# Patient Record
Sex: MALE | Race: WHITE
[De-identification: names, ages, dates, MRNs, and addresses within clinical notes are randomized per-mention and may not be internally consistent; named-entity substitution may affect disease eponyms.]

---

## 2018-01-26 ENCOUNTER — HOSPITAL ENCOUNTER (OUTPATIENT)
Dept: HOSPITAL 33 - SDC | Age: 38
Discharge: HOME | End: 2018-01-26
Attending: FAMILY MEDICINE
Payer: COMMERCIAL

## 2018-01-26 VITALS — SYSTOLIC BLOOD PRESSURE: 132 MMHG | HEART RATE: 85 BPM | DIASTOLIC BLOOD PRESSURE: 68 MMHG | OXYGEN SATURATION: 99 %

## 2018-01-26 DIAGNOSIS — K60.2: Primary | ICD-10-CM

## 2018-01-26 PROCEDURE — 00812 ANES LWR INTST SCR COLSC: CPT

## 2018-01-26 PROCEDURE — 0DJD8ZZ INSPECTION OF LOWER INTESTINAL TRACT, VIA NATURAL OR ARTIFICIAL OPENING ENDOSCOPIC: ICD-10-PCS | Performed by: FAMILY MEDICINE

## 2018-01-26 NOTE — OP
SURGERY DATE/TIME:    01/26/2018  0753



PREOPERATIVE DIAGNOSIS:    Rectal bleeding.  



POSTOPERATIVE DIAGNOSIS:    Anal fissure otherwise normal colon exam. 



PROCEDURE:    Colonoscopy.



SURGEON:    Dr. Witt.



ANESTHESIA:    MAC. Medications given by anesthesia department. 



HISTORY: The patient is a 38 year-old white male who reports he has been having bleeding 
off and on from the rectum for years. He reports this has gotten worse recently. It is 
painful and bleeds mostly with a large bowel movement but the patient was also complaining 
of some weight loss and there was some concern about possible family history of colon 
cancer. The patient was felt the need to have endoscopic evaluation. He was appraised of 
the risks of the procedure including the risk of perforation, phlebitis, untoward reaction 
to medication, bleeding and missed lesions.  The patient verbalized his understanding and 
desired to have the procedure performed.



DESCRIPTION OF PROCEDURE: The patient was given the medications by the anesthesia 
department. He had continuous pulse oximetry, ECG monitoring, intermittent blood pressure 
monitoring and tidal CO2 monitoring during the examination. He was placed in the left 
lateral decubitus position.  A digital rectal examination was performed and revealed what 
was felt to be a fissure at the 6:00 position which was somewhat friable and with a small 
amount of bleeding with digital examination. The anal sphincter tone was normal. There 
were no other masses felt. The prostate was normal.  The flexible Olympus pediatric 
colonoscope was used to intubate the rectum.  A view of the colon was developed 
sequentially to the cecum including a short distance into the terminal ileum.  Upon 
insertion and withdrawal, including a retroflex view in the rectum, no mucosal lesions 
were encountered.  The scope was removed from the patient who tolerated the procedure well 
and was sent back to OP recovery in good condition. The prep was noted to be fair.

## 2021-04-24 ENCOUNTER — HOSPITAL ENCOUNTER (EMERGENCY)
Dept: HOSPITAL 33 - ED | Age: 41
Discharge: HOME | End: 2021-04-24
Payer: SELF-PAY

## 2021-04-24 VITALS — SYSTOLIC BLOOD PRESSURE: 143 MMHG | DIASTOLIC BLOOD PRESSURE: 101 MMHG | OXYGEN SATURATION: 99 % | HEART RATE: 84 BPM

## 2021-04-24 DIAGNOSIS — N50.82: Primary | ICD-10-CM

## 2021-04-24 PROCEDURE — 96372 THER/PROPH/DIAG INJ SC/IM: CPT

## 2021-04-24 PROCEDURE — 99283 EMERGENCY DEPT VISIT LOW MDM: CPT

## 2021-04-24 NOTE — ERPHSYRPT
- History of Present Illness


Time Seen by Provider: 04/24/21 16:30


Source: patient


Exam Limitations: no limitations


Patient Subjective Stated Complaint: PT states "I have had horrible pain in my 

groin since yesterday and my right testicle is swollen and really hurts."


Triage Nursing Assessment: Pt presented alert and oriented X 3, skin pwd pt 

ambulates with a wide gait, able to speak in clear full sentences pt in no 

apparent respiratory distress.


Physician History: 





This is a 41-year-old white male who has a tender, swollen right testicle with 

scrotal redness on the right side.  Patient states that he has been very 

sexually active in the last several days.  His current symptoms came on suddenly

yesterday and were worse today.  He has had no penile discharge.  He had a 

similar episode several years ago but it went away on his own.  The swelling at 

that time, and tenderness, were much worse but it lasted several days before it 

went away.  Patient states that he is not allergic to any medication..  He also 

stated that he does not have any insurance and does not want a test done or x-

ray studies performed.  He only wants an antibiotic.


Timing/Duration: yesterday


Activites at Onset: sexual activity


Quality: burning, sharpness


Onset Location: scrotal, right testicle


Pain Radiation: none


Severity of Pain-Max: moderate


Severity of Pain-Current: moderate


Modifying Factors: Improves With: nothing


Associated Symptoms: swelling, No abdominal pain, No fever, No chills, No 

nausea, No vomiting, No dysuria, No urinary frequency, No loss of bladder 

control, No lower back pain


Prior abdominal problems: none


Sexual intercourse history: multiple partners, unprotected intercourse


Allergies/Adverse Reactions: 








No Known Drug Allergies Allergy (Verified 01/26/18 06:50)


   





Hx Tetanus, Diphtheria Vaccination/Date Given: No


Hx Influenza Vaccination/Date Given: No


Hx Pneumococcal Vaccination/Date Given: No


Immunizations Up to Date: Yes





Travel Risk





- International Travel


Have you traveled outside of the country in past 3 weeks: No





- Coronavirus Screening


Are you exhibiting any of the following symptoms?: No


Close contact with a COVID-19 positive Pt in past 14-21 Days: No





- Vaccine Status


Have you recieved a Covid-19 vaccination: No





- Past Medical History


Pertinent Past Medical History: Yes


Neurological History: No Pertinent History


ENT History: No Pertinent History


Cardiac History: No Pertinent History


Respiratory History: No Pertinent History


Endocrine Medical History: No Pertinent History


Musculoskeletal History: No Pertinent History


GI Medical History: Diverticulitis


 History: No Pertinent History


Psycho-Social History: No Pertinent History


Male Reproductive Disorders: No Pertinent History





- Past Surgical History


Past Surgical History: Yes


Neuro Surgical History: No Pertinent History


Cardiac: No Pertinent History


Respiratory: No Pertinent History


Gastrointestinal: No Pertinent History


Genitourinary: No Pertinent History


Musculoskeletal: No Pertinent History, Orthopedic Surgery


Male Surgical History: No Pertinent History





- Social History


Smoking Status: Current every day smoker


How long have you smoked: years


Exposure to second hand smoke: Yes


Drug Use: marijuana


Patient Lives Alone: No





- Review of Systems


Constitutional: No Symptoms


Eyes: No Symptoms


Ears, Nose, & Throat: No Symptoms


Respiratory: No Symptoms


Cardiac: No Symptoms


Abdominal/Gastrointestinal: No Symptoms


Genitourinary Symptoms: Testicle Pain (Right side)


Musculoskeletal: No Symptoms


Skin: No Symptoms


Neurological: No Symptoms


Psychological: No Symptoms


Endocrine: No Symptoms


Hematologic/Lymphatic: No Symptoms


Immunological/Allergic: No Symptoms


All Other Systems: Reviewed and Negative





- Nursing Vital Signs


Nursing Vital Signs: 





                               Initial Vital Signs











Temperature  98.6 F   04/24/21 16:25


 


Pulse Rate  84   04/24/21 16:25


 


Respiratory Rate  20   04/24/21 16:25


 


Blood Pressure  143/101   04/24/21 16:25


 


O2 Sat by Pulse Oximetry  99   04/24/21 16:25








                                   Pain Scale











Pain Intensity                 6

















- Physical Exam


General Appearance: no apparent distress, alert, anxiety


Eye Exam: PERRL/EOMI, eyes nml inspection


Ears, Nose, Throat Exam: normal ENT inspection, moist mucous membranes


Neck Exam: normal inspection, non-tender, supple, full range of motion


Respiratory Exam: airway intact, No chest tenderness, No respiratory distress


Gastrointestinal/Abdomen Exam: No tenderness


Rectal Exam: not done


Male Genital Exam: scrotum tenderness (R), testicular tenderness (R), scrotal 

swellling (Right side)


Back Exam: normal inspection


Extremity Exam: normal inspection, normal range of motion, pelvis stable


Neurologic Exam: alert, oriented x 3, cooperative, CNs II-XII nml as tested, 

normal mood/affect, nml cerebellar function, nml station & gait, sensation nml


Skin Exam: normal color, warm, dry


Lymphatic Exam: No adenopathy


**SpO2 Interpretation**: normal


SpO2: 99


O2 Delivery: Room Air





- Course


Nursing assessment & vital signs reviewed: Yes


Ordered Tests: 





Medication Summary














Discontinued Medications














Generic Name Dose Route Start Last Admin





  Trade Name Rajeev  PRN Reason Stop Dose Admin


 


Ceftriaxone Sodium  1,000 mg  04/24/21 16:38 





  Rocephin 1000 Mg Inj**  IM  04/24/21 16:39 





  STAT ONE  














- Progress


Progress: unchanged


Progress Note: 





04/24/21 16:46


It is recommended to the patient that we do a urinalysis as well as an ult

rasound of his scrotum/testicles.  Patient refuses both.  Patient states the 

only thing he would like is a shot of antibiotics.  I explained to him that he 

may have more than a simple epididymitis.  He might have a urinary tract 

infection or some other type of sexually transmitted infection.  He might have a

torsion of his testicular cord and testicle on the right side.  He refuses both 

the urinalysis and the ultrasound.  He is aware that his condition may worsen 

and he may even lose his testicle (S).  He will sign a refusal of treatment 

form.


Counseled pt/family regarding: diagnosis, need for follow-up





- Departure


Departure Disposition: Home


Clinical Impression: 


 Tender scrotum, Swelling of right testicle, Testicle tenderness





Condition: Stable


Critical Care Time: No


Referrals: 


JOLENE JIMENEZ [Primary Care Provider] - 


Additional Instructions: 


Drink plenty of fluids.  Avoid sexual intercourse.  Use Tylenol and ibuprofen 

for pain control.  Follow-up with your primary care physician for further 

management.  Return to the emergency department if symptoms worsen.  Take 

medication as prescribed


Prescriptions: 


Ciprofloxacin [Cipro 500 MG***] 500 mg PO BID #14 tablet